# Patient Record
Sex: FEMALE | Race: ASIAN | NOT HISPANIC OR LATINO
[De-identification: names, ages, dates, MRNs, and addresses within clinical notes are randomized per-mention and may not be internally consistent; named-entity substitution may affect disease eponyms.]

---

## 2023-11-17 ENCOUNTER — APPOINTMENT (OUTPATIENT)
Dept: OBGYN | Facility: CLINIC | Age: 33
End: 2023-11-17
Payer: COMMERCIAL

## 2023-11-17 ENCOUNTER — NON-APPOINTMENT (OUTPATIENT)
Age: 33
End: 2023-11-17

## 2023-11-17 VITALS
HEIGHT: 69 IN | DIASTOLIC BLOOD PRESSURE: 79 MMHG | BODY MASS INDEX: 23.99 KG/M2 | SYSTOLIC BLOOD PRESSURE: 120 MMHG | WEIGHT: 162 LBS

## 2023-11-17 LAB — HCG UR QL: POSITIVE

## 2023-11-17 PROCEDURE — 81025 URINE PREGNANCY TEST: CPT

## 2023-11-17 PROCEDURE — 99385 PREV VISIT NEW AGE 18-39: CPT

## 2023-11-17 PROCEDURE — 99202 OFFICE O/P NEW SF 15 MIN: CPT | Mod: 25

## 2023-11-19 ENCOUNTER — TRANSCRIPTION ENCOUNTER (OUTPATIENT)
Age: 33
End: 2023-11-19

## 2023-11-20 LAB — HPV HIGH+LOW RISK DNA PNL CVX: NOT DETECTED

## 2023-11-22 ENCOUNTER — APPOINTMENT (OUTPATIENT)
Dept: OBGYN | Facility: CLINIC | Age: 33
End: 2023-11-22
Payer: COMMERCIAL

## 2023-11-22 LAB — CYTOLOGY CVX/VAG DOC THIN PREP: NORMAL

## 2023-11-22 PROCEDURE — 36415 COLL VENOUS BLD VENIPUNCTURE: CPT

## 2023-11-25 LAB
B19V IGG SER QL IA: NEGATIVE
B19V IGG+IGM SER-IMP: NORMAL
B19V IGM FLD-ACNC: NEGATIVE
CMV IGG SERPL QL: 3.8 U/ML
CMV IGG SERPL-IMP: POSITIVE
CMV IGM SERPL QL: <8 AU/ML
CMV IGM SERPL QL: NEGATIVE
HGB A MFR BLD: 97.5 %
HGB A2 MFR BLD: 2.5 %
HGB FRACT BLD-IMP: NORMAL
LEAD BLD-MCNC: <1 UG/DL
TSH SERPL-ACNC: 2.26 UIU/ML

## 2023-11-28 LAB
AR GENE MUT ANL BLD/T: NORMAL
FMR1 GENE MUT ANL BLD/T: NORMAL

## 2023-12-04 ENCOUNTER — APPOINTMENT (OUTPATIENT)
Dept: OBGYN | Facility: CLINIC | Age: 33
End: 2023-12-04
Payer: COMMERCIAL

## 2023-12-04 VITALS
WEIGHT: 161 LBS | HEIGHT: 69 IN | SYSTOLIC BLOOD PRESSURE: 132 MMHG | DIASTOLIC BLOOD PRESSURE: 77 MMHG | BODY MASS INDEX: 23.85 KG/M2

## 2023-12-04 PROCEDURE — 36415 COLL VENOUS BLD VENIPUNCTURE: CPT

## 2023-12-04 PROCEDURE — 0501F PRENATAL FLOW SHEET: CPT

## 2023-12-29 ENCOUNTER — APPOINTMENT (OUTPATIENT)
Dept: ANTEPARTUM | Facility: CLINIC | Age: 33
End: 2023-12-29
Payer: COMMERCIAL

## 2023-12-29 PROCEDURE — 76811 OB US DETAILED SNGL FETUS: CPT

## 2024-01-05 ENCOUNTER — APPOINTMENT (OUTPATIENT)
Dept: OBGYN | Facility: CLINIC | Age: 34
End: 2024-01-05
Payer: COMMERCIAL

## 2024-01-05 PROCEDURE — 0501F PRENATAL FLOW SHEET: CPT

## 2024-02-02 ENCOUNTER — APPOINTMENT (OUTPATIENT)
Dept: OBGYN | Facility: CLINIC | Age: 34
End: 2024-02-02
Payer: COMMERCIAL

## 2024-02-02 PROCEDURE — 0502F SUBSEQUENT PRENATAL CARE: CPT

## 2024-02-02 PROCEDURE — 36415 COLL VENOUS BLD VENIPUNCTURE: CPT

## 2024-02-05 LAB
BASOPHILS # BLD AUTO: 0.04 K/UL
BASOPHILS NFR BLD AUTO: 0.4 %
EOSINOPHIL # BLD AUTO: 0.05 K/UL
EOSINOPHIL NFR BLD AUTO: 0.5 %
GLUCOSE 1H P 50 G GLC PO SERPL-MCNC: 164 MG/DL
HCT VFR BLD CALC: 36.9 %
HGB BLD-MCNC: 12.4 G/DL
IMM GRANULOCYTES NFR BLD AUTO: 0.7 %
LYMPHOCYTES # BLD AUTO: 1.07 K/UL
LYMPHOCYTES NFR BLD AUTO: 10.7 %
MAN DIFF?: NORMAL
MCHC RBC-ENTMCNC: 31.5 PG
MCHC RBC-ENTMCNC: 33.6 GM/DL
MCV RBC AUTO: 93.7 FL
MONOCYTES # BLD AUTO: 0.3 K/UL
MONOCYTES NFR BLD AUTO: 3 %
NEUTROPHILS # BLD AUTO: 8.51 K/UL
NEUTROPHILS NFR BLD AUTO: 84.7 %
PLATELET # BLD AUTO: 278 K/UL
RBC # BLD: 3.94 M/UL
RBC # FLD: 13.4 %
WBC # FLD AUTO: 10.04 K/UL

## 2024-02-12 ENCOUNTER — NON-APPOINTMENT (OUTPATIENT)
Age: 34
End: 2024-02-12

## 2024-02-23 ENCOUNTER — APPOINTMENT (OUTPATIENT)
Dept: MATERNAL FETAL MEDICINE | Facility: CLINIC | Age: 34
End: 2024-02-23
Payer: COMMERCIAL

## 2024-02-23 ENCOUNTER — ASOB RESULT (OUTPATIENT)
Age: 34
End: 2024-02-23

## 2024-02-23 PROCEDURE — G0109 DIAB MANAGE TRN IND/GROUP: CPT | Mod: 95

## 2024-02-23 RX ORDER — INSULIN HUMAN 100 [IU]/ML
100 INJECTION, SUSPENSION SUBCUTANEOUS
Qty: 1 | Refills: 2 | Status: ACTIVE | COMMUNITY
Start: 2024-02-23 | End: 1900-01-01

## 2024-02-23 RX ORDER — BLOOD-GLUCOSE METER
KIT MISCELLANEOUS 4 TIMES DAILY
Qty: 2 | Refills: 2 | Status: ACTIVE | COMMUNITY
Start: 2024-02-23 | End: 1900-01-01

## 2024-02-23 RX ORDER — PEN NEEDLE, DIABETIC 32GX 5/32"
32G X 4 MM NEEDLE, DISPOSABLE MISCELLANEOUS
Qty: 2 | Refills: 1 | Status: ACTIVE | COMMUNITY
Start: 2024-02-23 | End: 1900-01-01

## 2024-02-23 RX ORDER — LANCETS 33 GAUGE
EACH MISCELLANEOUS
Qty: 4 | Refills: 2 | Status: ACTIVE | COMMUNITY
Start: 2024-02-23 | End: 1900-01-01

## 2024-02-23 RX ORDER — BLOOD-GLUCOSE METER
W/DEVICE KIT MISCELLANEOUS
Qty: 1 | Refills: 0 | Status: ACTIVE | COMMUNITY
Start: 2024-02-23 | End: 1900-01-01

## 2024-02-23 RX ORDER — URINE ACETONE TEST STRIPS
STRIP MISCELLANEOUS
Qty: 1 | Refills: 0 | Status: ACTIVE | COMMUNITY
Start: 2024-02-23 | End: 1900-01-01

## 2024-02-23 RX ORDER — ISOPROPYL ALCOHOL 0.7 ML/ML
SWAB TOPICAL
Qty: 1 | Refills: 2 | Status: ACTIVE | COMMUNITY
Start: 2024-02-23 | End: 1900-01-01

## 2024-02-28 ENCOUNTER — NON-APPOINTMENT (OUTPATIENT)
Age: 34
End: 2024-02-28

## 2024-03-01 ENCOUNTER — APPOINTMENT (OUTPATIENT)
Dept: OBGYN | Facility: CLINIC | Age: 34
End: 2024-03-01
Payer: COMMERCIAL

## 2024-03-01 ENCOUNTER — NON-APPOINTMENT (OUTPATIENT)
Age: 34
End: 2024-03-01

## 2024-03-01 VITALS
WEIGHT: 170 LBS | SYSTOLIC BLOOD PRESSURE: 116 MMHG | BODY MASS INDEX: 25.18 KG/M2 | HEIGHT: 69 IN | DIASTOLIC BLOOD PRESSURE: 76 MMHG

## 2024-03-01 PROCEDURE — 0501F PRENATAL FLOW SHEET: CPT

## 2024-03-07 ENCOUNTER — ASOB RESULT (OUTPATIENT)
Age: 34
End: 2024-03-07

## 2024-03-07 ENCOUNTER — APPOINTMENT (OUTPATIENT)
Dept: MATERNAL FETAL MEDICINE | Facility: CLINIC | Age: 34
End: 2024-03-07
Payer: COMMERCIAL

## 2024-03-07 PROCEDURE — G0108 DIAB MANAGE TRN  PER INDIV: CPT | Mod: 95

## 2024-03-12 ENCOUNTER — NON-APPOINTMENT (OUTPATIENT)
Age: 34
End: 2024-03-12

## 2024-03-14 ENCOUNTER — APPOINTMENT (OUTPATIENT)
Dept: OBGYN | Facility: CLINIC | Age: 34
End: 2024-03-14
Payer: COMMERCIAL

## 2024-03-14 ENCOUNTER — ASOB RESULT (OUTPATIENT)
Age: 34
End: 2024-03-14

## 2024-03-14 ENCOUNTER — APPOINTMENT (OUTPATIENT)
Dept: ANTEPARTUM | Facility: CLINIC | Age: 34
End: 2024-03-14
Payer: COMMERCIAL

## 2024-03-14 PROCEDURE — 0502F SUBSEQUENT PRENATAL CARE: CPT

## 2024-03-14 PROCEDURE — 90471 IMMUNIZATION ADMIN: CPT

## 2024-03-14 PROCEDURE — 76819 FETAL BIOPHYS PROFIL W/O NST: CPT | Mod: 59

## 2024-03-14 PROCEDURE — 90715 TDAP VACCINE 7 YRS/> IM: CPT

## 2024-03-14 PROCEDURE — 76816 OB US FOLLOW-UP PER FETUS: CPT

## 2024-03-18 ENCOUNTER — NON-APPOINTMENT (OUTPATIENT)
Age: 34
End: 2024-03-18

## 2024-03-21 ENCOUNTER — APPOINTMENT (OUTPATIENT)
Dept: MATERNAL FETAL MEDICINE | Facility: CLINIC | Age: 34
End: 2024-03-21
Payer: COMMERCIAL

## 2024-03-21 ENCOUNTER — ASOB RESULT (OUTPATIENT)
Age: 34
End: 2024-03-21

## 2024-03-21 PROCEDURE — G0108 DIAB MANAGE TRN  PER INDIV: CPT | Mod: 95

## 2024-03-28 ENCOUNTER — ASOB RESULT (OUTPATIENT)
Age: 34
End: 2024-03-28

## 2024-03-28 ENCOUNTER — APPOINTMENT (OUTPATIENT)
Dept: MATERNAL FETAL MEDICINE | Facility: CLINIC | Age: 34
End: 2024-03-28
Payer: COMMERCIAL

## 2024-03-28 PROCEDURE — G0108 DIAB MANAGE TRN  PER INDIV: CPT | Mod: 95

## 2024-03-29 ENCOUNTER — NON-APPOINTMENT (OUTPATIENT)
Age: 34
End: 2024-03-29

## 2024-03-29 ENCOUNTER — APPOINTMENT (OUTPATIENT)
Dept: OBGYN | Facility: CLINIC | Age: 34
End: 2024-03-29
Payer: COMMERCIAL

## 2024-03-29 PROCEDURE — 0501F PRENATAL FLOW SHEET: CPT

## 2024-04-05 ENCOUNTER — ASOB RESULT (OUTPATIENT)
Age: 34
End: 2024-04-05

## 2024-04-05 ENCOUNTER — APPOINTMENT (OUTPATIENT)
Dept: MATERNAL FETAL MEDICINE | Facility: CLINIC | Age: 34
End: 2024-04-05
Payer: COMMERCIAL

## 2024-04-05 PROCEDURE — G0108 DIAB MANAGE TRN  PER INDIV: CPT | Mod: 95

## 2024-04-10 ENCOUNTER — NON-APPOINTMENT (OUTPATIENT)
Age: 34
End: 2024-04-10

## 2024-04-11 ENCOUNTER — APPOINTMENT (OUTPATIENT)
Dept: ANTEPARTUM | Facility: CLINIC | Age: 34
End: 2024-04-11
Payer: COMMERCIAL

## 2024-04-11 ENCOUNTER — ASOB RESULT (OUTPATIENT)
Age: 34
End: 2024-04-11

## 2024-04-11 PROCEDURE — 76819 FETAL BIOPHYS PROFIL W/O NST: CPT | Mod: 59

## 2024-04-11 PROCEDURE — 76816 OB US FOLLOW-UP PER FETUS: CPT

## 2024-04-12 ENCOUNTER — APPOINTMENT (OUTPATIENT)
Dept: OBGYN | Facility: CLINIC | Age: 34
End: 2024-04-12
Payer: COMMERCIAL

## 2024-04-12 ENCOUNTER — APPOINTMENT (OUTPATIENT)
Dept: MATERNAL FETAL MEDICINE | Facility: CLINIC | Age: 34
End: 2024-04-12
Payer: COMMERCIAL

## 2024-04-12 ENCOUNTER — ASOB RESULT (OUTPATIENT)
Age: 34
End: 2024-04-12

## 2024-04-12 PROCEDURE — G0108 DIAB MANAGE TRN  PER INDIV: CPT | Mod: 95

## 2024-04-12 PROCEDURE — 0502F SUBSEQUENT PRENATAL CARE: CPT

## 2024-04-23 ENCOUNTER — APPOINTMENT (OUTPATIENT)
Dept: OBGYN | Facility: CLINIC | Age: 34
End: 2024-04-23

## 2024-04-25 ENCOUNTER — NON-APPOINTMENT (OUTPATIENT)
Age: 34
End: 2024-04-25

## 2024-04-26 ENCOUNTER — NON-APPOINTMENT (OUTPATIENT)
Age: 34
End: 2024-04-26

## 2024-04-26 ENCOUNTER — APPOINTMENT (OUTPATIENT)
Dept: MATERNAL FETAL MEDICINE | Facility: CLINIC | Age: 34
End: 2024-04-26
Payer: COMMERCIAL

## 2024-04-26 ENCOUNTER — APPOINTMENT (OUTPATIENT)
Dept: OBGYN | Facility: CLINIC | Age: 34
End: 2024-04-26

## 2024-04-26 ENCOUNTER — APPOINTMENT (OUTPATIENT)
Dept: OBGYN | Facility: CLINIC | Age: 34
End: 2024-04-26
Payer: COMMERCIAL

## 2024-04-26 ENCOUNTER — ASOB RESULT (OUTPATIENT)
Age: 34
End: 2024-04-26

## 2024-04-26 DIAGNOSIS — N89.8 OTHER SPECIFIED NONINFLAMMATORY DISORDERS OF VAGINA: ICD-10-CM

## 2024-04-26 DIAGNOSIS — Z3A.36 36 WEEKS GESTATION OF PREGNANCY: ICD-10-CM

## 2024-04-26 LAB
CANDIDA VAG CYTO: NOT DETECTED
G VAGINALIS+PREV SP MTYP VAG QL MICRO: NOT DETECTED
T VAGINALIS VAG QL WET PREP: NOT DETECTED

## 2024-04-26 PROCEDURE — 76819 FETAL BIOPHYS PROFIL W/O NST: CPT

## 2024-04-26 PROCEDURE — 0502F SUBSEQUENT PRENATAL CARE: CPT

## 2024-04-26 PROCEDURE — G0108 DIAB MANAGE TRN  PER INDIV: CPT | Mod: 95

## 2024-04-26 NOTE — OB SUMMARY
[FreeTextEntry1] : 33 y/o  EDC 5/19/24 EGA 36+ weeks here for PN visit.  Pt with no c/o of ab pain or bleeding; +good FM.   Pt with c/o of mild vaginal itching but no discharge U/A today - WNL; /77 Pt with hx of: GDMA2 on Insulin - FS at home WNL 4/13/24 Sono - EFW 3086 gm (97%) NST today 140's; +variability; +accels; Overall NST reactive; BPP today 8/8 PE - speculum exam - +white discharge c/w yeast; mild vulvoaginitis [FreeTextEntry2] : A/P Pt with IUP at 36+ weeks.  Pt in stable condition.  Will:  1) Cont with NST/BPP 2x/wk 2) Pt to cont to check FS 3) RTC one week 4) Pt for IOL on 5/13/24 5) Sono for growth on 5/7/24 6) GBS cx done 7) Vaginitis Cx done - pt does not want any tx for yeast infection today 8) Labor precautions 9) Cont with Insulin and PNV  N Gabbur

## 2024-04-28 LAB — B-HEM STREP SPEC QL CULT: NORMAL

## 2024-05-02 ENCOUNTER — ASOB RESULT (OUTPATIENT)
Age: 34
End: 2024-05-02

## 2024-05-02 ENCOUNTER — APPOINTMENT (OUTPATIENT)
Dept: OBGYN | Facility: CLINIC | Age: 34
End: 2024-05-02
Payer: COMMERCIAL

## 2024-05-02 ENCOUNTER — APPOINTMENT (OUTPATIENT)
Dept: ANTEPARTUM | Facility: CLINIC | Age: 34
End: 2024-05-02

## 2024-05-02 PROCEDURE — 59025 FETAL NON-STRESS TEST: CPT | Mod: 59

## 2024-05-02 PROCEDURE — 0502F SUBSEQUENT PRENATAL CARE: CPT

## 2024-05-02 PROCEDURE — 36415 COLL VENOUS BLD VENIPUNCTURE: CPT

## 2024-05-02 PROCEDURE — 76819 FETAL BIOPHYS PROFIL W/O NST: CPT

## 2024-05-03 ENCOUNTER — NON-APPOINTMENT (OUTPATIENT)
Age: 34
End: 2024-05-03

## 2024-05-03 LAB
HIV1+2 AB SPEC QL IA.RAPID: NONREACTIVE
T PALLIDUM AB SER QL IA: NEGATIVE

## 2024-05-07 ENCOUNTER — APPOINTMENT (OUTPATIENT)
Dept: ANTEPARTUM | Facility: CLINIC | Age: 34
End: 2024-05-07
Payer: COMMERCIAL

## 2024-05-07 ENCOUNTER — ASOB RESULT (OUTPATIENT)
Age: 34
End: 2024-05-07

## 2024-05-07 PROCEDURE — 76819 FETAL BIOPHYS PROFIL W/O NST: CPT | Mod: 59

## 2024-05-07 PROCEDURE — 76816 OB US FOLLOW-UP PER FETUS: CPT

## 2024-05-09 ENCOUNTER — APPOINTMENT (OUTPATIENT)
Dept: OBGYN | Facility: CLINIC | Age: 34
End: 2024-05-09
Payer: COMMERCIAL

## 2024-05-09 DIAGNOSIS — Z34.00 ENCOUNTER FOR SUPERVISION OF NORMAL FIRST PREGNANCY, UNSPECIFIED TRIMESTER: ICD-10-CM

## 2024-05-09 PROCEDURE — 0502F SUBSEQUENT PRENATAL CARE: CPT

## 2024-05-09 PROCEDURE — 59025 FETAL NON-STRESS TEST: CPT | Mod: 59

## 2024-05-10 ENCOUNTER — APPOINTMENT (OUTPATIENT)
Dept: MATERNAL FETAL MEDICINE | Facility: CLINIC | Age: 34
End: 2024-05-10
Payer: COMMERCIAL

## 2024-05-10 ENCOUNTER — APPOINTMENT (OUTPATIENT)
Dept: OBGYN | Facility: CLINIC | Age: 34
End: 2024-05-10

## 2024-05-10 ENCOUNTER — ASOB RESULT (OUTPATIENT)
Age: 34
End: 2024-05-10

## 2024-05-10 ENCOUNTER — TRANSCRIPTION ENCOUNTER (OUTPATIENT)
Age: 34
End: 2024-05-10

## 2024-05-10 PROCEDURE — G0108 DIAB MANAGE TRN  PER INDIV: CPT | Mod: 95

## 2024-05-11 ENCOUNTER — INPATIENT (INPATIENT)
Facility: HOSPITAL | Age: 34
LOS: 1 days | Discharge: HOME | End: 2024-05-13
Attending: OBSTETRICS & GYNECOLOGY | Admitting: OBSTETRICS & GYNECOLOGY
Payer: COMMERCIAL

## 2024-05-11 ENCOUNTER — TRANSCRIPTION ENCOUNTER (OUTPATIENT)
Age: 34
End: 2024-05-11

## 2024-05-11 ENCOUNTER — APPOINTMENT (OUTPATIENT)
Dept: ANTEPARTUM | Facility: CLINIC | Age: 34
End: 2024-05-11

## 2024-05-11 VITALS
HEART RATE: 86 BPM | RESPIRATION RATE: 14 BRPM | TEMPERATURE: 99 F | SYSTOLIC BLOOD PRESSURE: 129 MMHG | DIASTOLIC BLOOD PRESSURE: 75 MMHG

## 2024-05-11 DIAGNOSIS — O26.899 OTHER SPECIFIED PREGNANCY RELATED CONDITIONS, UNSPECIFIED TRIMESTER: ICD-10-CM

## 2024-05-11 LAB
ALBUMIN SERPL ELPH-MCNC: 3.5 G/DL — SIGNIFICANT CHANGE UP (ref 3.3–5)
ALP SERPL-CCNC: 85 U/L — SIGNIFICANT CHANGE UP (ref 40–120)
ALT FLD-CCNC: 13 U/L — SIGNIFICANT CHANGE UP (ref 4–33)
ANION GAP SERPL CALC-SCNC: 13 MMOL/L — SIGNIFICANT CHANGE UP (ref 7–14)
APPEARANCE UR: CLEAR — SIGNIFICANT CHANGE UP
AST SERPL-CCNC: 12 U/L — SIGNIFICANT CHANGE UP (ref 4–32)
BACTERIA # UR AUTO: ABNORMAL /HPF
BASOPHILS # BLD AUTO: 0.02 K/UL — SIGNIFICANT CHANGE UP (ref 0–0.2)
BASOPHILS NFR BLD AUTO: 0.3 % — SIGNIFICANT CHANGE UP (ref 0–2)
BILIRUB SERPL-MCNC: <0.2 MG/DL — SIGNIFICANT CHANGE UP (ref 0.2–1.2)
BILIRUB UR-MCNC: NEGATIVE — SIGNIFICANT CHANGE UP
BLD GP AB SCN SERPL QL: NEGATIVE — SIGNIFICANT CHANGE UP
BUN SERPL-MCNC: 11 MG/DL — SIGNIFICANT CHANGE UP (ref 7–23)
CALCIUM SERPL-MCNC: 9 MG/DL — SIGNIFICANT CHANGE UP (ref 8.4–10.5)
CAST: 1 /LPF — SIGNIFICANT CHANGE UP (ref 0–4)
CHLORIDE SERPL-SCNC: 107 MMOL/L — SIGNIFICANT CHANGE UP (ref 98–107)
CO2 SERPL-SCNC: 18 MMOL/L — LOW (ref 22–31)
COLOR SPEC: YELLOW — SIGNIFICANT CHANGE UP
CREAT ?TM UR-MCNC: 120 MG/DL — SIGNIFICANT CHANGE UP
CREAT SERPL-MCNC: 0.44 MG/DL — LOW (ref 0.5–1.3)
DIFF PNL FLD: ABNORMAL
EGFR: 128 ML/MIN/1.73M2 — SIGNIFICANT CHANGE UP
EOSINOPHIL # BLD AUTO: 0.01 K/UL — SIGNIFICANT CHANGE UP (ref 0–0.5)
EOSINOPHIL NFR BLD AUTO: 0.1 % — SIGNIFICANT CHANGE UP (ref 0–6)
GLUCOSE BLDC GLUCOMTR-MCNC: 82 MG/DL — SIGNIFICANT CHANGE UP (ref 70–99)
GLUCOSE SERPL-MCNC: 93 MG/DL — SIGNIFICANT CHANGE UP (ref 70–99)
GLUCOSE UR QL: NEGATIVE MG/DL — SIGNIFICANT CHANGE UP
HCT VFR BLD CALC: 37.4 % — SIGNIFICANT CHANGE UP (ref 34.5–45)
HCV AB S/CO SERPL IA: 0.08 S/CO — SIGNIFICANT CHANGE UP (ref 0–0.99)
HCV AB SERPL-IMP: SIGNIFICANT CHANGE UP
HGB BLD-MCNC: 12.7 G/DL — SIGNIFICANT CHANGE UP (ref 11.5–15.5)
IANC: 6.62 K/UL — SIGNIFICANT CHANGE UP (ref 1.8–7.4)
IMM GRANULOCYTES NFR BLD AUTO: 0.3 % — SIGNIFICANT CHANGE UP (ref 0–0.9)
KETONES UR-MCNC: ABNORMAL MG/DL
LDH SERPL L TO P-CCNC: 175 U/L — SIGNIFICANT CHANGE UP (ref 135–225)
LEUKOCYTE ESTERASE UR-ACNC: ABNORMAL
LYMPHOCYTES # BLD AUTO: 0.94 K/UL — LOW (ref 1–3.3)
LYMPHOCYTES # BLD AUTO: 12 % — LOW (ref 13–44)
MCHC RBC-ENTMCNC: 30.3 PG — SIGNIFICANT CHANGE UP (ref 27–34)
MCHC RBC-ENTMCNC: 34 GM/DL — SIGNIFICANT CHANGE UP (ref 32–36)
MCV RBC AUTO: 89.3 FL — SIGNIFICANT CHANGE UP (ref 80–100)
MONOCYTES # BLD AUTO: 0.22 K/UL — SIGNIFICANT CHANGE UP (ref 0–0.9)
MONOCYTES NFR BLD AUTO: 2.8 % — SIGNIFICANT CHANGE UP (ref 2–14)
NEUTROPHILS # BLD AUTO: 6.62 K/UL — SIGNIFICANT CHANGE UP (ref 1.8–7.4)
NEUTROPHILS NFR BLD AUTO: 84.5 % — HIGH (ref 43–77)
NITRITE UR-MCNC: NEGATIVE — SIGNIFICANT CHANGE UP
NRBC # BLD: 0 /100 WBCS — SIGNIFICANT CHANGE UP (ref 0–0)
NRBC # FLD: 0 K/UL — SIGNIFICANT CHANGE UP (ref 0–0)
PH UR: 6 — SIGNIFICANT CHANGE UP (ref 5–8)
PLATELET # BLD AUTO: 188 K/UL — SIGNIFICANT CHANGE UP (ref 150–400)
POTASSIUM SERPL-MCNC: 4 MMOL/L — SIGNIFICANT CHANGE UP (ref 3.5–5.3)
POTASSIUM SERPL-SCNC: 4 MMOL/L — SIGNIFICANT CHANGE UP (ref 3.5–5.3)
PROT ?TM UR-MCNC: 37 MG/DL — SIGNIFICANT CHANGE UP
PROT SERPL-MCNC: 6.3 G/DL — SIGNIFICANT CHANGE UP (ref 6–8.3)
PROT UR-MCNC: 30 MG/DL
PROT/CREAT UR-RTO: 0.3 RATIO — HIGH (ref 0–0.2)
RBC # BLD: 4.19 M/UL — SIGNIFICANT CHANGE UP (ref 3.8–5.2)
RBC # FLD: 13.7 % — SIGNIFICANT CHANGE UP (ref 10.3–14.5)
RBC CASTS # UR COMP ASSIST: 0 /HPF — SIGNIFICANT CHANGE UP (ref 0–4)
RH IG SCN BLD-IMP: POSITIVE — SIGNIFICANT CHANGE UP
SODIUM SERPL-SCNC: 138 MMOL/L — SIGNIFICANT CHANGE UP (ref 135–145)
SP GR SPEC: 1.03 — SIGNIFICANT CHANGE UP (ref 1–1.03)
SQUAMOUS # UR AUTO: 7 /HPF — HIGH (ref 0–5)
URATE SERPL-MCNC: 4.6 MG/DL — SIGNIFICANT CHANGE UP (ref 2.5–7)
UROBILINOGEN FLD QL: 0.2 MG/DL — SIGNIFICANT CHANGE UP (ref 0.2–1)
WBC # BLD: 7.83 K/UL — SIGNIFICANT CHANGE UP (ref 3.8–10.5)
WBC # FLD AUTO: 7.83 K/UL — SIGNIFICANT CHANGE UP (ref 3.8–10.5)
WBC UR QL: 15 /HPF — HIGH (ref 0–5)

## 2024-05-11 PROCEDURE — 59510 CESAREAN DELIVERY: CPT

## 2024-05-11 RX ORDER — IBUPROFEN 200 MG
600 TABLET ORAL EVERY 6 HOURS
Refills: 0 | Status: COMPLETED | OUTPATIENT
Start: 2024-05-11 | End: 2025-04-09

## 2024-05-11 RX ORDER — HEPARIN SODIUM 5000 [USP'U]/ML
5000 INJECTION INTRAVENOUS; SUBCUTANEOUS EVERY 12 HOURS
Refills: 0 | Status: DISCONTINUED | OUTPATIENT
Start: 2024-05-11 | End: 2024-05-13

## 2024-05-11 RX ORDER — MORPHINE SULFATE 50 MG/1
4 CAPSULE, EXTENDED RELEASE ORAL ONCE
Refills: 0 | Status: DISCONTINUED | OUTPATIENT
Start: 2024-05-11 | End: 2024-05-11

## 2024-05-11 RX ORDER — FAMOTIDINE 10 MG/ML
20 INJECTION INTRAVENOUS ONCE
Refills: 0 | Status: DISCONTINUED | OUTPATIENT
Start: 2024-05-11 | End: 2024-05-11

## 2024-05-11 RX ORDER — CHLORHEXIDINE GLUCONATE 213 G/1000ML
1 SOLUTION TOPICAL DAILY
Refills: 0 | Status: DISCONTINUED | OUTPATIENT
Start: 2024-05-11 | End: 2024-05-12

## 2024-05-11 RX ORDER — TETANUS TOXOID, REDUCED DIPHTHERIA TOXOID AND ACELLULAR PERTUSSIS VACCINE, ADSORBED 5; 2.5; 8; 8; 2.5 [IU]/.5ML; [IU]/.5ML; UG/.5ML; UG/.5ML; UG/.5ML
0.5 SUSPENSION INTRAMUSCULAR ONCE
Refills: 0 | Status: DISCONTINUED | OUTPATIENT
Start: 2024-05-11 | End: 2024-05-13

## 2024-05-11 RX ORDER — SIMETHICONE 80 MG/1
80 TABLET, CHEWABLE ORAL EVERY 4 HOURS
Refills: 0 | Status: DISCONTINUED | OUTPATIENT
Start: 2024-05-11 | End: 2024-05-13

## 2024-05-11 RX ORDER — SODIUM CHLORIDE 9 MG/ML
1000 INJECTION, SOLUTION INTRAVENOUS
Refills: 0 | Status: DISCONTINUED | OUTPATIENT
Start: 2024-05-11 | End: 2024-05-13

## 2024-05-11 RX ORDER — SODIUM CHLORIDE 9 MG/ML
1000 INJECTION, SOLUTION INTRAVENOUS
Refills: 0 | Status: DISCONTINUED | OUTPATIENT
Start: 2024-05-11 | End: 2024-05-12

## 2024-05-11 RX ORDER — OXYTOCIN 10 UNIT/ML
333.33 VIAL (ML) INJECTION
Qty: 20 | Refills: 0 | Status: DISCONTINUED | OUTPATIENT
Start: 2024-05-11 | End: 2024-05-12

## 2024-05-11 RX ORDER — OXYCODONE HYDROCHLORIDE 5 MG/1
5 TABLET ORAL ONCE
Refills: 0 | Status: DISCONTINUED | OUTPATIENT
Start: 2024-05-11 | End: 2024-05-13

## 2024-05-11 RX ORDER — MAGNESIUM HYDROXIDE 400 MG/1
30 TABLET, CHEWABLE ORAL
Refills: 0 | Status: DISCONTINUED | OUTPATIENT
Start: 2024-05-11 | End: 2024-05-13

## 2024-05-11 RX ORDER — SODIUM CHLORIDE 9 MG/ML
1000 INJECTION, SOLUTION INTRAVENOUS
Refills: 0 | Status: DISCONTINUED | OUTPATIENT
Start: 2024-05-11 | End: 2024-05-11

## 2024-05-11 RX ORDER — FAMOTIDINE 10 MG/ML
20 INJECTION INTRAVENOUS ONCE
Refills: 0 | Status: DISCONTINUED | OUTPATIENT
Start: 2024-05-11 | End: 2024-05-13

## 2024-05-11 RX ORDER — CITRIC ACID/SODIUM CITRATE 300-500 MG
30 SOLUTION, ORAL ORAL ONCE
Refills: 0 | Status: DISCONTINUED | OUTPATIENT
Start: 2024-05-11 | End: 2024-05-11

## 2024-05-11 RX ORDER — LANOLIN
1 OINTMENT (GRAM) TOPICAL EVERY 6 HOURS
Refills: 0 | Status: DISCONTINUED | OUTPATIENT
Start: 2024-05-11 | End: 2024-05-13

## 2024-05-11 RX ORDER — OXYCODONE HYDROCHLORIDE 5 MG/1
5 TABLET ORAL
Refills: 0 | Status: DISCONTINUED | OUTPATIENT
Start: 2024-05-11 | End: 2024-05-13

## 2024-05-11 RX ORDER — ACETAMINOPHEN 500 MG
975 TABLET ORAL
Refills: 0 | Status: DISCONTINUED | OUTPATIENT
Start: 2024-05-11 | End: 2024-05-13

## 2024-05-11 RX ORDER — KETOROLAC TROMETHAMINE 30 MG/ML
30 SYRINGE (ML) INJECTION EVERY 6 HOURS
Refills: 0 | Status: DISCONTINUED | OUTPATIENT
Start: 2024-05-11 | End: 2024-05-12

## 2024-05-11 RX ORDER — CITRIC ACID/SODIUM CITRATE 300-500 MG
30 SOLUTION, ORAL ORAL ONCE
Refills: 0 | Status: DISCONTINUED | OUTPATIENT
Start: 2024-05-11 | End: 2024-05-13

## 2024-05-11 RX ORDER — DIPHENHYDRAMINE HCL 50 MG
25 CAPSULE ORAL EVERY 6 HOURS
Refills: 0 | Status: DISCONTINUED | OUTPATIENT
Start: 2024-05-11 | End: 2024-05-13

## 2024-05-11 RX ORDER — CHLORHEXIDINE GLUCONATE 213 G/1000ML
1 SOLUTION TOPICAL DAILY
Refills: 0 | Status: DISCONTINUED | OUTPATIENT
Start: 2024-05-11 | End: 2024-05-11

## 2024-05-11 RX ORDER — OXYTOCIN 10 UNIT/ML
333.33 VIAL (ML) INJECTION
Qty: 20 | Refills: 0 | Status: DISCONTINUED | OUTPATIENT
Start: 2024-05-11 | End: 2024-05-11

## 2024-05-11 RX ADMIN — HEPARIN SODIUM 5000 UNIT(S): 5000 INJECTION INTRAVENOUS; SUBCUTANEOUS at 18:51

## 2024-05-11 RX ADMIN — Medication 30 MILLIGRAM(S): at 19:32

## 2024-05-11 RX ADMIN — Medication 30 MILLIGRAM(S): at 18:50

## 2024-05-11 RX ADMIN — Medication 975 MILLIGRAM(S): at 21:25

## 2024-05-11 RX ADMIN — Medication 975 MILLIGRAM(S): at 22:00

## 2024-05-11 NOTE — OB PROVIDER H&P - HISTORY OF PRESENT ILLNESS
35yo female  @ 38.6 wks SLIUP GDMA2 on Humulin 26u QHS here complaining of ctx's Q5 min apart with spotting. Pt reports the pain as 4/10. Pt denies any LOF, reports GFM. Pt is scheduled for IOL  for GDMA2. Pt with ATU sono 24 revealing an EFW of 4062 at 97-%ile.    PNC complicated by GDMA2 35yo female  @ 38.6 wks SLIUP GDMA2 on Humulin 26u QHS here complaining of ctx's Q5 min apart with spotting. Pt reports the pain as 4/10. Pt denies any LOF, reports GFM. Pt is scheduled for IOL  for GDMA2. Pt with ATU sono 24 revealing an EFW of 4062 at 97-%ile.  Pt denies HA, RUQ or epigastric pain, denies visual changes.    PNC complicated by GDMA2

## 2024-05-11 NOTE — OB PROVIDER TRIAGE NOTE - NSHPPHYSICALEXAM_GEN_ALL_CORE
ICU Vital Signs Last 24 Hrs  T(C): 37 (11 May 2024 09:10), Max: 37 (11 May 2024 09:10)  T(F): 98.6 (11 May 2024 09:10), Max: 98.6 (11 May 2024 09:10)  HR: 77 (11 May 2024 09:49) (76 - 86)  BP: 131/71 (11 May 2024 09:49) (129/75 - 141/80)  BP(mean): --  ABP: --  ABP(mean): --  RR: 14 (11 May 2024 09:10) (14 - 14)  SpO2: --    Gen: A&O x 3; NAD    Neuro- symmetrical facial features with no slurring of words  Pulm- breathing is unlabored  Abd exam- soft and nontender  Extremities- full range of motion x 4 with no calf tenderness    NST reactive with 145 baseline with accels and mod variability; Ctx's Q2-5 min apart  EFW per ATU 5/9/2024 4062g (97-%ile)  V-1/80/-2  Abd sono- Images saved to sono- Vtx; anterior placenta; ODALYS-9.67; 8/8 BPP

## 2024-05-11 NOTE — DISCHARGE NOTE OB - CARE PROVIDER_API CALL
Myrna Zaldivar  Obstetrics and Gynecology  [facility] [place] [address]  Phone: [phone]  Fax: [phone]  Follow Up Time:

## 2024-05-11 NOTE — OB PROVIDER H&P - ASSESSMENT
35yo female P0 @ 38.6 wks SLIUP GDMA2 here complaining of ctx's Q5 min apart 4/10   -pt was admitted and discussed with Dr Fishman  -Dr Fishman in to  pt about  vs vaginal delivery given LGA fetus of 97-%ile  -pt decides on primary   -NPO status at 4p  -pt was discussed with anesthesia  -pt was approved for morphine by Dr Fishman  33yo female P0 @ 38.6 wks SLIUP GDMA2 here complaining of ctx's Q5 min apart 4/10   -pt was admitted and discussed with Dr Fishman  -Dr Fishman in to  pt about  vs vaginal delivery given LGA fetus of 97-%ile  -pt decides on primary   -NPO status at 4p  -pt was discussed with anesthesia  -baseline HELLP labs sent  -pt was approved for epidural by Dr Fishman until pt reaches NPO status

## 2024-05-11 NOTE — DISCHARGE NOTE OB - NS MD DC FALL RISK RISK
For information on Fall & Injury Prevention, visit: [email or web address] OR  [email or web address] OR  https://www.cdc.gov/steadi/patient.html

## 2024-05-11 NOTE — DISCHARGE NOTE OB - MEDICATION SUMMARY - MEDICATIONS TO TAKE
I will START or STAY ON the medications listed below when I get home from the hospital:    ibuprofen 600 mg oral tablet  -- 1 tab(s) by mouth every 6 hours  -- Indication: For  delivery delivered    acetaminophen 325 mg oral tablet  -- 3 tab(s) by mouth every 6 hours  -- Indication: For  delivery delivered    Prenatal Multivitamins with Folic Acid 1 mg oral tablet  -- 1 tab(s) by mouth once a day  -- Indication: For  delivery delivered

## 2024-05-11 NOTE — OB PROVIDER DELIVERY SUMMARY - NS_AFTERADMROM_OBGYN_ALL_OB_DT
Acute massive pulmonary embolism Acute massive pulmonary embolism Acute massive pulmonary embolism 11-May-2024 12:34

## 2024-05-11 NOTE — DISCHARGE NOTE OB - MATERIALS PROVIDED
Vaccinations/[facility]  Screening Program/Guide to Postpartum Care/[facility] Hearing Screen Program/Shaken Baby Prevention Handout/Birth Certificate Instructions/Tdap Vaccination (VIS Pub Date: 2012)

## 2024-05-11 NOTE — OB PROVIDER TRIAGE NOTE - HISTORY OF PRESENT ILLNESS
35yo female  @ 38.6 wks SLIUP GDMA2 on Humulin 26u QHS here complaining of ctx's Q5 min apart with spotting. Pt reports the pain as 4/10. Pt denies any LOF, reports GFM. Pt is scheduled for IOL  for GDMA2.     PNC complicated by GDMA2

## 2024-05-11 NOTE — OB PROVIDER DELIVERY SUMMARY - NSSELHIDDEN_OBGYN_ALL_OB_FT
[NS_DeliveryAttending1_OBGYN_ALL_OB_FT:[id number]==],[NS_DeliveryRN_OBGYN_ALL_OB_FT:[id number]=]

## 2024-05-11 NOTE — OB RN INTRAOPERATIVE NOTE - NSSELHIDDEN_OBGYN_ALL_OB_FT
[NS_DeliveryAttending1_OBGYN_ALL_OB_FT:[id number]==],[NS_DeliveryRN_OBGYN_ALL_OB_FT:[id number]=]

## 2024-05-11 NOTE — OB RN DELIVERY SUMMARY - NS_CORDBLDREASONA_OBGYN_ALL_OB
Pt denies med changes or bleeding problems. Pt was instructed to discontinue Lovenox; pt verbalized. Pt was inquiring on Eliquis and desire to switch anticoagulants. Pt was isntructed to make appt with LALA Vásquez to discuss Eliquis if he wishes to switch; pt verbalized. Patient instructed regarding medication; results given and questions answered. Nutritional counseling given.  Dietary factors affecting therapy addressed.  Patient instructed to monitor for excessive bruising or bleeding. Will recheck in 5 days.          This document has been electronically signed by LALA Winters on April 17, 2017 10:38 AM       Mother is RH Positive

## 2024-05-11 NOTE — OB PROVIDER TRIAGE NOTE - NSOBPROVIDERNOTE_OBGYN_ALL_OB_FT
33yo female P0 @ 38.6 wks SLIUP GDMA2 here complaining of ctx's Q5 min apart 4/10   -VE-1/80/-2 35yo female P0 @ 38.6 wks SLIUP GDMA2 here complaining of ctx's Q5 min apart 4/10   -pt was admitted and discussed with Dr Fishman  -Dr Fishman in to  pt about  vs vaginal delivery given LGA fetus of 97-%ile  -pt decides on primary   -NPO status at 4p  -pt was discussed with anesthesia  -approved for morphine by Dr Fishman

## 2024-05-11 NOTE — DISCHARGE NOTE OB - CARE PLAN
Principal Discharge DX:	 delivery delivered  Assessment and plan of treatment:	Routine post operative care   1

## 2024-05-11 NOTE — CHART NOTE - NSCHARTNOTEFT_GEN_A_CORE
OB Attending    Huddle held for unscheduled C/S. Patient s/p epidural but still having painful contractions and SROM  Plan to proceed with C/S despite NPO given progression of labor  Anesthesia and RN team aware  Written Informed consent for c/s obtained    ORLIN Fishman MD
OB Attending    Patient 38 weeks with GDMA2 and LGA fetus EFW 8#15 (4000g). Patient presenting in early labor. Patient seen at bedside to discuss MOD.   Discussed continuing labor with possible vaginal delivery and risk of shoulder dystocia and higher order vaginal tear  Also discussed primary C/S and associated maternal risks of bleeding, infection and injury to surrounding organs.   After extensive counseling, patient opting for primary C/S.   Will await NPO at 4pm unless intervention indicated sooner    ORLIN Fishman MD

## 2024-05-11 NOTE — OB RN DELIVERY SUMMARY - NS_SEPSISRSKCALC_OBGYN_ALL_OB_FT
Rupture of membranes must be entered above.   EOS calculated successfully. EOS Risk Factor: 0.5/1000 live births ([facility] national incidence); GA=38w6d; Temp=98.6; ROM=1.667; GBS='Negative'; Antibiotics='No antibiotics or any antibiotics < 2 hrs prior to birth'

## 2024-05-11 NOTE — OB PROVIDER DELIVERY SUMMARY - NSPROVIDERDELIVERYNOTE_OBGYN_ALL_OB_FT
Primary LTCS, uncomplicated for suspected macrosomia  Viable  infant, Vertex presentation, cord gasses sent  Grossly normal fallopian tubes, uterus, and ovaries  Hysterotomy closed in one layer    Antibiotics: Ancef, Azithro  Uterotonics: IV Pitocin    EBL: 1081  IVF: 2000  UOP: 250    Plan  -routine post op care    [place] PGY2  w/ Dr. Fishman Primary elective LTCS, uncomplicated for suspected macrosomia in setting of GDMA2  Viable  infant, Vertex presentation, cord gasses sent  Grossly normal fallopian tubes, uterus, and ovaries  Hysterotomy closed in one layer    Antibiotics: Ancef, Azithromycin  Uterotonics: IV Pitocin    EBL: 1081  IVF: 2000  UOP: 250    Plan  -routine post op care    [place] PGY2  w/ Dr. Fishman

## 2024-05-11 NOTE — DISCHARGE NOTE OB - PATIENT PORTAL LINK FT
You can access the FollowMyHealth Patient Portal offered by [facility] by registering at the following website: [email or web address]. By joining [employer]’s FollowMyHealth portal, you will also be able to view your health information using other applications (apps) compatible with our system.

## 2024-05-11 NOTE — OB RN DELIVERY SUMMARY - NSSELHIDDEN_OBGYN_ALL_OB_FT
[NS_DeliveryAttending1_OBGYN_ALL_OB_FT:[id number]==],[NS_DeliveryRN_OBGYN_ALL_OB_FT:[id number]=]

## 2024-05-12 LAB
BASOPHILS # BLD AUTO: 0.03 K/UL — SIGNIFICANT CHANGE UP (ref 0–0.2)
BASOPHILS NFR BLD AUTO: 0.2 % — SIGNIFICANT CHANGE UP (ref 0–2)
EOSINOPHIL # BLD AUTO: 0.02 K/UL — SIGNIFICANT CHANGE UP (ref 0–0.5)
EOSINOPHIL NFR BLD AUTO: 0.1 % — SIGNIFICANT CHANGE UP (ref 0–6)
HCT VFR BLD CALC: 33.2 % — LOW (ref 34.5–45)
HGB BLD-MCNC: 11.5 G/DL — SIGNIFICANT CHANGE UP (ref 11.5–15.5)
IANC: 12.4 K/UL — HIGH (ref 1.8–7.4)
IMM GRANULOCYTES NFR BLD AUTO: 0.3 % — SIGNIFICANT CHANGE UP (ref 0–0.9)
LYMPHOCYTES # BLD AUTO: 1.66 K/UL — SIGNIFICANT CHANGE UP (ref 1–3.3)
LYMPHOCYTES # BLD AUTO: 11.3 % — LOW (ref 13–44)
MCHC RBC-ENTMCNC: 30.7 PG — SIGNIFICANT CHANGE UP (ref 27–34)
MCHC RBC-ENTMCNC: 34.6 GM/DL — SIGNIFICANT CHANGE UP (ref 32–36)
MCV RBC AUTO: 88.8 FL — SIGNIFICANT CHANGE UP (ref 80–100)
MONOCYTES # BLD AUTO: 0.54 K/UL — SIGNIFICANT CHANGE UP (ref 0–0.9)
MONOCYTES NFR BLD AUTO: 3.7 % — SIGNIFICANT CHANGE UP (ref 2–14)
NEUTROPHILS # BLD AUTO: 12.4 K/UL — HIGH (ref 1.8–7.4)
NEUTROPHILS NFR BLD AUTO: 84.4 % — HIGH (ref 43–77)
NRBC # BLD: 0 /100 WBCS — SIGNIFICANT CHANGE UP (ref 0–0)
NRBC # FLD: 0 K/UL — SIGNIFICANT CHANGE UP (ref 0–0)
PLATELET # BLD AUTO: 202 K/UL — SIGNIFICANT CHANGE UP (ref 150–400)
RBC # BLD: 3.74 M/UL — LOW (ref 3.8–5.2)
RBC # FLD: 13.2 % — SIGNIFICANT CHANGE UP (ref 10.3–14.5)
RUBV IGG SER-ACNC: 0.4 INDEX — SIGNIFICANT CHANGE UP
RUBV IGG SER-IMP: NEGATIVE — SIGNIFICANT CHANGE UP
T PALLIDUM AB TITR SER: NEGATIVE — SIGNIFICANT CHANGE UP
WBC # BLD: 14.7 K/UL — HIGH (ref 3.8–10.5)
WBC # FLD AUTO: 14.7 K/UL — HIGH (ref 3.8–10.5)

## 2024-05-12 RX ORDER — IBUPROFEN 200 MG
600 TABLET ORAL EVERY 6 HOURS
Refills: 0 | Status: DISCONTINUED | OUTPATIENT
Start: 2024-05-12 | End: 2024-05-13

## 2024-05-12 RX ADMIN — Medication 975 MILLIGRAM(S): at 04:00

## 2024-05-12 RX ADMIN — Medication 975 MILLIGRAM(S): at 21:34

## 2024-05-12 RX ADMIN — HEPARIN SODIUM 5000 UNIT(S): 5000 INJECTION INTRAVENOUS; SUBCUTANEOUS at 17:13

## 2024-05-12 RX ADMIN — Medication 30 MILLIGRAM(S): at 06:00

## 2024-05-12 RX ADMIN — Medication 975 MILLIGRAM(S): at 09:12

## 2024-05-12 RX ADMIN — Medication 975 MILLIGRAM(S): at 16:02

## 2024-05-12 RX ADMIN — Medication 30 MILLIGRAM(S): at 12:22

## 2024-05-12 RX ADMIN — Medication 975 MILLIGRAM(S): at 22:05

## 2024-05-12 RX ADMIN — HEPARIN SODIUM 5000 UNIT(S): 5000 INJECTION INTRAVENOUS; SUBCUTANEOUS at 05:17

## 2024-05-12 RX ADMIN — Medication 975 MILLIGRAM(S): at 10:12

## 2024-05-12 RX ADMIN — Medication 30 MILLIGRAM(S): at 13:22

## 2024-05-12 RX ADMIN — Medication 30 MILLIGRAM(S): at 05:17

## 2024-05-12 RX ADMIN — Medication 30 MILLIGRAM(S): at 01:10

## 2024-05-12 RX ADMIN — Medication 600 MILLIGRAM(S): at 17:14

## 2024-05-12 RX ADMIN — Medication 975 MILLIGRAM(S): at 03:26

## 2024-05-12 RX ADMIN — Medication 975 MILLIGRAM(S): at 15:06

## 2024-05-12 RX ADMIN — Medication 30 MILLIGRAM(S): at 01:43

## 2024-05-12 RX ADMIN — Medication 600 MILLIGRAM(S): at 18:14

## 2024-05-12 NOTE — LACTATION INITIAL EVALUATION - INTERVENTION OUTCOME
Utilized Guide to Postpartum and [place] Care book as a visual reference for mom to better understand teaching points and to utilize at home  to review the education presented during hospitalization. Instructed in hand expression with good return demonstration. + colostrum noted. Encouraged to breastfeed the baby on demand based on cues and at least 8-12 times in a day. Instructed to log feedings along with wet and dirty diapers.  <24 hours at time of consult, with +latch with sustained rhythmic sucking.  Encouraged to call for assistance as needed. lactation to follow up./verbalizes understanding/demonstrates understanding of teaching/needs met

## 2024-05-12 NOTE — PROGRESS NOTE ADULT - ASSESSMENT
35yo POD#1 s/p elective pLTCS. EBL: 1081ml. Vitals stable. Dressing removed, incision clean, dry and intact. Pt recovering well postoperatively    #plan  - f/u AM CBC  - continue with PO analgesia  - increase ambulation  - continue regular diet  - encourage incentive spirometry    Flor Price PGY1   35yo POD#1 s/p elective pLTCS. EBL: 1081ml. Vitals stable. Dressing removed, incision clean, dry and intact. Pt recovering well postoperatively    #plan  - f/u AM CBC  - continue with PO analgesia  - increase ambulation  - continue regular diet  - encourage incentive spirometry    Flor Price PGY1    Attending: Pt seen and examined. Agree with above                        11.5   14.70 )-----------( 202      ( 12 May 2024 05:25 )             33.2     -SAMAN Ann MD

## 2024-05-12 NOTE — LACTATION INITIAL EVALUATION - LACTATION INTERVENTIONS
initiate/review safe skin-to-skin/initiate/review hand expression/initiate/review techniques for position and latch/review techniques to increase milk supply/initiate/review breast massage/compression/reviewed components of an effective feeding and at least 8 effective feedings per day required/reviewed importance of monitoring infant diapers, the breastfeeding log, and minimum output each day/reviewed risks of unnecessary formula supplementation/reviewed risks of artificial nipples/reviewed benefits and recommendations for rooming in/reviewed feeding on demand/by cue at least 8 times a day/reviewed indications of inadequate milk transfer that would require supplementation

## 2024-05-13 VITALS
DIASTOLIC BLOOD PRESSURE: 72 MMHG | OXYGEN SATURATION: 99 % | HEART RATE: 80 BPM | SYSTOLIC BLOOD PRESSURE: 124 MMHG | RESPIRATION RATE: 18 BRPM | TEMPERATURE: 98 F

## 2024-05-13 PROBLEM — Z78.9 OTHER SPECIFIED HEALTH STATUS: Chronic | Status: ACTIVE | Noted: 2024-05-11

## 2024-05-13 RX ORDER — HUMAN INSULIN 100 [IU]/ML
26 INJECTION, SUSPENSION SUBCUTANEOUS
Refills: 0 | DISCHARGE

## 2024-05-13 RX ORDER — ACETAMINOPHEN 500 MG
3 TABLET ORAL
Qty: 0 | Refills: 0 | DISCHARGE
Start: 2024-05-13

## 2024-05-13 RX ORDER — IBUPROFEN 200 MG
1 TABLET ORAL
Qty: 0 | Refills: 0 | DISCHARGE
Start: 2024-05-13

## 2024-05-13 RX ADMIN — Medication 600 MILLIGRAM(S): at 13:30

## 2024-05-13 RX ADMIN — Medication 975 MILLIGRAM(S): at 15:16

## 2024-05-13 RX ADMIN — Medication 1 TABLET(S): at 12:29

## 2024-05-13 RX ADMIN — Medication 600 MILLIGRAM(S): at 05:48

## 2024-05-13 RX ADMIN — Medication 600 MILLIGRAM(S): at 12:29

## 2024-05-13 RX ADMIN — HEPARIN SODIUM 5000 UNIT(S): 5000 INJECTION INTRAVENOUS; SUBCUTANEOUS at 05:48

## 2024-05-13 RX ADMIN — Medication 600 MILLIGRAM(S): at 00:10

## 2024-05-13 RX ADMIN — Medication 975 MILLIGRAM(S): at 03:29

## 2024-05-13 RX ADMIN — Medication 975 MILLIGRAM(S): at 04:00

## 2024-05-13 RX ADMIN — Medication 600 MILLIGRAM(S): at 06:30

## 2024-05-13 RX ADMIN — Medication 975 MILLIGRAM(S): at 16:00

## 2024-05-13 RX ADMIN — Medication 600 MILLIGRAM(S): at 00:40

## 2024-05-13 NOTE — PROGRESS NOTE ADULT - SUBJECTIVE AND OBJECTIVE BOX
ANESTHESIA POSTOP CHECK    34y Female POSTOP DAY 1     No COMPLAINTS    NO APPARENT ANESTHESIA COMPLICATIONS      
S: Patient doing well. No complaints. Minimal lochia. Pain controlled.    O: Vital Signs Last 24 Hrs  T(C): 37.1 (13 May 2024 14:30), Max: 37.1 (13 May 2024 14:30)  T(F): 98.7 (13 May 2024 14:30), Max: 98.7 (13 May 2024 14:30)  HR: 82 (13 May 2024 14:30) (74 - 82)  BP: 129/76 (13 May 2024 14:30) (124/72 - 129/76)  BP(mean): --  RR: 17 (13 May 2024 14:30) (16 - 18)  SpO2: 97% (13 May 2024 14:30) (97% - 98%)    Parameters below as of 13 May 2024 14:30  Patient On (Oxygen Delivery Method): room air        Gen: NAD  Abd: soft, Nontender, Nondistended, fundus firm  Ext: no tendern, mild edema    Labs:                        11.5   14.70 )-----------(       ( 12 May 2024 05:25 )             33.2       A: 34y POD#2 s/p  doing well.  Plan:  Routine postpartum care  Encouraged out of bed  Regular diet
Patient seen and examined at bedside, no acute overnight events. No acute complaints, pain well controlled. Patient is ambulating, voiding spontaneously, passing flatus, and tolerating regular diet. Denies CP, SOB, N/V, HA, blurred vision, epigastric pain.    Vital Signs Last 24 Hours  T(C): 36.7 (05-12-24 @ 02:08), Max: 37 (05-11-24 @ 09:10)  HR: 58 (05-12-24 @ 02:08) (58 - 89)  BP: 121/75 (05-12-24 @ 02:08) (104/83 - 144/79)  RR: 17 (05-12-24 @ 02:08) (13 - 19)  SpO2: 98% (05-12-24 @ 02:08) (95% - 100%)    Physical Exam:  General: NAD  Abdomen: Soft, expected tenderness, non-distended, fundus firm  Incision: Pfannenstiel incision CDI, steristrips in place  Pelvic: Lochia wnl    Labs:    Blood Type: B Positive  Antibody Screen: Negative  RPR: Negative               12.7   7.83  )-----------( 188      ( 05-11 @ 11:26 )             37.4         MEDICATIONS  (STANDING):  acetaminophen     Tablet .. 975 milliGRAM(s) Oral <User Schedule>  chlorhexidine 2% Cloths 1 Application(s) Topical daily  citric acid/sodium citrate Solution 30 milliLiter(s) Oral once  dextrose 5% + lactated ringers. 1000 milliLiter(s) (125 mL/Hr) IV Continuous <Continuous>  diphtheria/tetanus/pertussis (acellular) Vaccine (Adacel) 0.5 milliLiter(s) IntraMuscular once  famotidine Injectable 20 milliGRAM(s) IV Push once  heparin   Injectable 5000 Unit(s) SubCutaneous every 12 hours  ibuprofen  Tablet. 600 milliGRAM(s) Oral every 6 hours  ketorolac   Injectable 30 milliGRAM(s) IV Push every 6 hours  lactated ringers. 1000 milliLiter(s) (125 mL/Hr) IV Continuous <Continuous>  lactated ringers. 1000 milliLiter(s) (200 mL/Hr) IV Continuous <Continuous>  lactated ringers. 1000 milliLiter(s) (125 mL/Hr) IV Continuous <Continuous>  oxytocin Infusion 333.333 milliUNIT(s)/Min (1000 mL/Hr) IV Continuous <Continuous>  oxytocin Infusion 333.333 milliUNIT(s)/Min (1000 mL/Hr) IV Continuous <Continuous>  prenatal multivitamin 1 Tablet(s) Oral daily    MEDICATIONS  (PRN):  diphenhydrAMINE 25 milliGRAM(s) Oral every 6 hours PRN Pruritus  lanolin Ointment 1 Application(s) Topical every 6 hours PRN Sore Nipples  magnesium hydroxide Suspension 30 milliLiter(s) Oral two times a day PRN Constipation  oxyCODONE    IR 5 milliGRAM(s) Oral every 3 hours PRN Moderate to Severe Pain (4-10)  oxyCODONE    IR 5 milliGRAM(s) Oral once PRN Moderate to Severe Pain (4-10)  simethicone 80 milliGRAM(s) Chew every 4 hours PRN Gas

## 2024-05-17 ENCOUNTER — APPOINTMENT (OUTPATIENT)
Dept: OBGYN | Facility: CLINIC | Age: 34
End: 2024-05-17

## 2024-05-17 ENCOUNTER — APPOINTMENT (OUTPATIENT)
Age: 34
End: 2024-05-17
Payer: COMMERCIAL

## 2024-05-17 PROCEDURE — S9443: CPT | Mod: 95

## 2024-05-20 ENCOUNTER — APPOINTMENT (OUTPATIENT)
Dept: OBGYN | Facility: CLINIC | Age: 34
End: 2024-05-20
Payer: COMMERCIAL

## 2024-05-20 ENCOUNTER — APPOINTMENT (OUTPATIENT)
Dept: OBGYN | Facility: CLINIC | Age: 34
End: 2024-05-20

## 2024-05-20 VITALS — SYSTOLIC BLOOD PRESSURE: 124 MMHG | DIASTOLIC BLOOD PRESSURE: 88 MMHG | HEIGHT: 69 IN

## 2024-05-20 DIAGNOSIS — R22.33 LOCALIZED SWELLING, MASS AND LUMP, UPPER LIMB, BILATERAL: ICD-10-CM

## 2024-05-20 PROCEDURE — 0503F POSTPARTUM CARE VISIT: CPT

## 2024-05-20 NOTE — HISTORY OF PRESENT ILLNESS
[Postpartum Follow Up] : postpartum follow up [Complications:___] : complications include: [unfilled] [Gestational Diabetes] : gestational diabetes [Primary C/S] : delivered by  section [Female] : Delivery History: baby girl [Wt. ___] : weighing [unfilled] [Breastfeeding] : currently nursing [BF with Difficulty] : nursing without difficulty [Resumed Menses] : has not resumed her menses [Resumed [place]] : has not resumed intercourse [Abdominal Pain] : no abdominal pain [Back Pain] : no back pain [Breast Pain] : no breast pain [BreastFeeding Problems] : no breastfeeding problems [Chest Pain] : no chest pain [Cracked Nipples] : no cracked nipples [S/Sx PP Depression] : no signs/symptoms of postpartum depression [Episiotomy Site Pain] : no episiotomy site pain [Heavy Bleeding] : no heavy bleeding [Incisional Drainage] : no incisional drainage [Incisional Pain] : no incisional pain [Irregular Bleeding] : no irregular bleeding [Leg Pain] : no leg pain [Shortness of Breath] : no shortness of breath [Suicidal Ideation] : no suicidal ideation [Vaginal Discharge] : no vaginal discharge [Clean/Dry/Intact] : clean, dry and intact [Erythema] : not erythematous [Back to Normal] : is back to normal in size [___ wks] : is [unfilled] weeks in size [Mild] : mild vaginal bleeding [Normal] : the vagina was normal [Cervix Sample Taken] : cervical sample not taken for a Pap smear [Not Done] : Examination of breasts not done [Doing Well] : is doing well [No Sign of Infection] : is showing no signs of infection [Excellent Pain Control] : has excellent pain control [None] : None [FreeTextEntry8] : 35 y/o  s/p 1 LTCS on 24 c/o bilateral axillary pea size masses noted when started BF. [de-identified] : incision healing well,  referral given for bilateral axillary u/s.  return in 4 weeks for postpartum appt.

## 2024-05-28 ENCOUNTER — NON-APPOINTMENT (OUTPATIENT)
Age: 34
End: 2024-05-28

## 2024-05-28 ENCOUNTER — APPOINTMENT (OUTPATIENT)
Dept: ULTRASOUND IMAGING | Facility: CLINIC | Age: 34
End: 2024-05-28
Payer: COMMERCIAL

## 2024-05-28 PROCEDURE — 76882 US LMTD JT/FCL EVL NVASC XTR: CPT | Mod: LT

## 2024-06-17 ENCOUNTER — APPOINTMENT (OUTPATIENT)
Dept: OBGYN | Facility: CLINIC | Age: 34
End: 2024-06-17
Payer: COMMERCIAL

## 2024-06-17 VITALS
WEIGHT: 166 LBS | HEIGHT: 69 IN | BODY MASS INDEX: 24.59 KG/M2 | DIASTOLIC BLOOD PRESSURE: 82 MMHG | SYSTOLIC BLOOD PRESSURE: 120 MMHG

## 2024-06-17 DIAGNOSIS — O24.419 GESTATIONAL DIABETES MELLITUS IN PREGNANCY, UNSPECIFIED CONTROL: ICD-10-CM

## 2024-06-17 PROCEDURE — 0503F POSTPARTUM CARE VISIT: CPT

## 2024-06-17 NOTE — HISTORY OF PRESENT ILLNESS
[Postpartum Follow Up] : postpartum follow up [Complications:___] : complications include: [unfilled] [Gestational Diabetes] : gestational diabetes [Last Pap Date: ___] : Last Pap Date: [unfilled] [Primary C/S] : delivered by  section [Female] : Delivery History: baby girl [Wt. ___] : weighing [unfilled] [Breastfeeding] : currently nursing [Resumed [place]] : has resumed intercourse [Intended Contraception] : Intended Contraception: [Condoms] : condoms [BF with Difficulty] : nursing without difficulty [Resumed Menses] : has not resumed her menses [Abdominal Pain] : no abdominal pain [Back Pain] : no back pain [Breast Pain] : no breast pain [BreastFeeding Problems] : no breastfeeding problems [Chest Pain] : no chest pain [Cracked Nipples] : no cracked nipples [S/Sx PP Depression] : no signs/symptoms of postpartum depression [Heavy Bleeding] : no heavy bleeding [Incisional Drainage] : no incisional drainage [Incisional Pain] : no incisional pain [Irregular Bleeding] : no irregular bleeding [Leg Pain] : no leg pain [Shortness of Breath] : no shortness of breath [Suicidal Ideation] : no suicidal ideation [Vaginal Discharge] : no vaginal discharge [Clean/Dry/Intact] : clean, dry and intact [Healed] : healed [Back to Normal] : is back to normal in size [___ wks] : is [unfilled] weeks in size [Mild] : mild vaginal bleeding [Normal] : the vagina was normal [Cervix Sample Taken] : cervical sample not taken for a Pap smear [Not Done] : Examination of breasts not done [Doing Well] : is doing well [No Sign of Infection] : is showing no signs of infection [Excellent Pain Control] : has excellent pain control [None] : None [FreeTextEntry8] : 35 y/o  s/p 1 LTCS on 24 presents for postpartum appt.  reports lochia stopped after 2 weeks but restarted VB w cramps like a period, started 3 days ago,  denies moderate/heavy vb, and today light brown spotting.   Pt continues to have bilateral axillary masses and was advised biopsy of right axillary mass and diagnostic mammo but patient refusing mammo bc fear of pain.  pt has a breast specialist and has an upcoming appt. [de-identified] : Discussed contraception: LARCs vs TRIP,  tvus for VB, f/u w breast specialist and diagnostic mammo for axillary mass/birads 4.  PP GGT ordered.

## 2024-06-21 ENCOUNTER — ASOB RESULT (OUTPATIENT)
Age: 34
End: 2024-06-21

## 2024-06-21 ENCOUNTER — APPOINTMENT (OUTPATIENT)
Dept: OBGYN | Facility: CLINIC | Age: 34
End: 2024-06-21
Payer: COMMERCIAL

## 2024-06-21 PROCEDURE — 76830 TRANSVAGINAL US NON-OB: CPT

## 2024-07-22 RX ORDER — SULFAMETHOXAZOLE AND TRIMETHOPRIM 800; 160 MG/1; MG/1
800-160 TABLET ORAL
Qty: 28 | Refills: 0 | Status: ACTIVE | COMMUNITY
Start: 2024-07-22 | End: 1900-01-01

## 2024-07-29 ENCOUNTER — NON-APPOINTMENT (OUTPATIENT)
Age: 34
End: 2024-07-29

## 2024-11-14 ENCOUNTER — APPOINTMENT (OUTPATIENT)
Dept: OBGYN | Facility: CLINIC | Age: 34
End: 2024-11-14
Payer: COMMERCIAL

## 2024-11-14 VITALS
HEIGHT: 70 IN | DIASTOLIC BLOOD PRESSURE: 81 MMHG | SYSTOLIC BLOOD PRESSURE: 123 MMHG | WEIGHT: 160 LBS | BODY MASS INDEX: 22.9 KG/M2

## 2024-11-14 DIAGNOSIS — Z01.419 ENCOUNTER FOR GYNECOLOGICAL EXAMINATION (GENERAL) (ROUTINE) W/OUT ABNORMAL FINDINGS: ICD-10-CM

## 2024-11-14 DIAGNOSIS — Z30.09 ENCOUNTER FOR OTHER GENERAL COUNSELING AND ADVICE ON CONTRACEPTION: ICD-10-CM

## 2024-11-14 LAB — HCG UR QL: NEGATIVE

## 2024-11-14 PROCEDURE — 81025 URINE PREGNANCY TEST: CPT

## 2024-11-14 PROCEDURE — 99395 PREV VISIT EST AGE 18-39: CPT

## 2024-11-14 PROCEDURE — 99459 PELVIC EXAMINATION: CPT

## 2024-11-14 RX ORDER — NORETHINDRONE ACETATE AND ETHINYL ESTRADIOL AND FERROUS FUMARATE 1MG-20(21)
1-20 KIT ORAL
Qty: 6 | Refills: 1 | Status: ACTIVE | COMMUNITY
Start: 2024-11-14 | End: 1900-01-01

## 2024-11-15 LAB — HPV HIGH+LOW RISK DNA PNL CVX: NOT DETECTED

## 2024-11-18 LAB — CYTOLOGY CVX/VAG DOC THIN PREP: NORMAL

## 2025-03-05 NOTE — OB RN DELIVERY SUMMARY - BABY A: WEIGHT IN POUNDS (FROM GRAMS), DELIVERY
please try using practices at home to help alleviate your anxiety which include meditation.  There are 3-minute meditations you can find on YouTube and do these twice daily.  You can also get out in nature do some grounding with your feet and the ground  and journaling.   Please make sure to follow-up with a psychiatrist and a therapist or counselor.  I feel that you could benefit from both of these.   8